# Patient Record
Sex: MALE
[De-identification: names, ages, dates, MRNs, and addresses within clinical notes are randomized per-mention and may not be internally consistent; named-entity substitution may affect disease eponyms.]

---

## 2020-09-15 NOTE — RAD
Portable frontal chest radiograph:

9/15/2020



COMPARISON: 3/31/2020



HISTORY: Wheezing, shortness of breath



FINDINGS: Mild increased linear interstitial density noted with pulmonary hyperinflation suggesting a
ir trapping. Question a history of COPD. There is atherosclerotic calcification of the aortic arch.

There is no pneumothorax or pleural fluid and no focal consolidation or alveolar edema.



IMPRESSION: Interstitial prominence and pulmonary hyperinflation with no focal consolidation or alveo
lar edema.



Reported By: Jatin Mcdowell 

Electronically Signed:  9/15/2020 6:44 PM

## 2020-09-16 NOTE — CON
DATE OF CONSULTATION:  



HISTORY OF PRESENT ILLNESS:  Yaniv Hearn is a 79-year-old gentleman, who has

known history of aspirin allergy.  He inadvertently took some aspirin yesterday for

pain in the left arm.  Two hours later on, he developed swelling in his arm, facial

edema, throat swelling up, and some shortness of breath.  Denied any wheezing or

coughing.  He says he has never had asthma and does not smoke.  He was given

Decadron 10, epinephrine 0.3, and transferred to [facility]. 



He is in the MICU this morning, he appears relatively stable.



PAST MEDICAL HISTORY:  Pertinent for hypertension and hypothyroidism.



PREVIOUS SURGERIES:  None recently.



HOME MEDICATIONS:  Include;

1. __________.

2. Reglan 10.

3. Losartan 25.

4. Synthroid 88.

5. Breo one puff a day.

6. Plavix 75.

7. Dutasteride 0.5 a day. 

He is now started on Solu-Medrol and Dulera.



REVIEW OF SYSTEMS:  Ten-point negative.



PHYSICAL EXAMINATION:

VITAL SIGNS:  Saturations are 100% on room air, temperature 98, blood pressure

124/75, pulse __________. 

CHEST:  No wheezing.  No crackles. 

CARDIAC:  Normal S1 and S2.  No gallops. 

ABDOMEN:  No masses.



DIAGNOSTIC STUDIES:  His x-ray was clear.  White count 9000.  Lytes are normal.  PO2

was 79, pCO2 of 35, pH 7.40, on room air. 



Renal function normal.



ASSESSMENT:  

1. 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

no wheezing myself. 

2. History of hypothyroidism.

3. History of hypertension. 



Pulmonary wise, restart home medication.  Switch him over to oral prednisone.  Can

probably be discharged home in the next 24 to 48 hours if he remains stable. 



Consultation note 70 minutes, 50% direct patient care.







Job ID:  [id number]

## 2020-09-16 NOTE — HP
REASON FOR ADMISSION:  Difficulty with breathing.



HISTORY OF PRESENT ILLNESS:  This is a 79-year-old male patient, who had left arm

pain and took some ibuprofen, subsequently developed shortness of breath.  Went to

the urgent care, found to be wheezing.  He was given a dose of Decadron and neb

treatment without any improvement.  His oxygenation was still low.  EMS was called

and he was transported to our emergency room.  En route, he did receive 0.3 mg IV of

epinephrine.  The patient did improve.  Currently, he is in the IMCU.  Appears to be

doing very well in no acute distress. 



The patient denies taking aspirin, although it was mentioned in the notes that he

took aspirin.  He had only took Advil.  He is allergic to aspirin, does develop what

sounded like anaphylaxis to it. 



PAST MEDICAL HISTORY:  

1. High blood pressure.

2. Hypothyroidism.

3. Asthma.



SOCIAL HISTORY:  Does not smoke.  Does not drink alcohol.



FAMILY HISTORY:  Reviewed found to be noncontributory.



REVIEW OF SYSTEMS:  All systems reviewed except the above mentioned, found to be

negative. 



PHYSICAL EXAMINATION:

GENERAL:  Awake, alert, and oriented, does not appear in distress. 

VITAL SIGNS:  His blood pressure is 130/71, heart rate of 78, and saturating 100%

room air. 

HEENT:  Head is nontraumatic and normocephalic.  Pupils equal and reactive.

Extraocular movements are intact.  Nonicteric sclerae.  Well injected conjunctivae.

Oral mucosa normal.  Nasal mucosa normal. 

NECK:  Supple.  No adenopathy.  No murmur.  Thyroid is not palpable.  Trachea is

midline.  No supraclavicular lymphadenopathy. 

HEART:  S1 and S2.  Regular.  No murmur.  No gallops.  No friction rubs.  No

displacement of PMI. 

LUNGS:  Clear to auscultation bilaterally.  No wheezes, rhonchi, or crackles.  Bowel

sounds are positive.  Nontender abdomen.  No visceromegaly. 

EXTREMITIES:  No lower extremity edema.  No cyanosis. 

NEURO:  Cranial nerves 2 through 12 within normal limits.  Normal motor function.

Normal sensory function.  Normal reflexes. 



LABORATORY DATA:  Blood work shows a WBC of 9.5, hemoglobin of 13.8, and platelets

of 167.  D-dimer 0.71.  ABG shows a pH of 7.4, pCO2 of 35.1, and bicarb of 21.

Sodium 138, potassium 3.6, bicarb 21, BUN 23, and creatinine 1.7.  Troponin less

than 0.01.  Chest x-ray shows interstitial prominence and pulmonary hyperinflation.

No focal consolidation or alveolar edema. 



ASSESSMENT AND PLAN:  This is a 79-year-old male patient, presenting after what

sounded like an anaphylactic reaction to ibuprofen.  He is already allergic to

aspirin.  Did receive Decadron and neb treatments, then had to receive epinephrine.

He did have a substantial improvement and feels better already, but we want to admit

him to monitor him for delayed anaphylaxis. 



The patient will be admitted to IM.  We will start him on IV Solu-Medrol to

prevent any delayed anaphylaxis. 



In regard of his hypothyroidism, we will continue levothyroxine. 



In regards of his asthma, continue with his inhalers. 



For his high blood pressure, we will have him on IV hydralazine on as-needed basis.

We will hold angiotensin receptor blockers for now just as a precautionary measure. 



For deep venous thrombosis prophylaxis, he will be on sequential compression devices.







Job ID:  [id number]

## 2020-09-16 NOTE — DIS
DATE OF ADMISSION:  09/15/2020



DATE OF DISCHARGE:  09/16/2020



DISCHARGE DIAGNOSES:  Anaphylaxis secondary to ibuprofen.

CONSULTATIONS:  Pulmonary, Dr. Jah Power.

PROCEDURES:  None.



BRIEF HISTORY OF PRESENT ILLNESS:  This is a 79-year-old male with no past 
medical

history except for hypertension who presented to the emergency room after he

experienced swelling in his arm, facial swelling, and shortness of breath.  The

patient states that he had some left arm pain.  He has an allergy to aspirin, so

took ibuprofen.  3 hours later he started developing difficulty breathing,

itching, and felt extremely sick.  He called the ambulance and was given a dose 
of

Decadron and nebulizer without any improvement.  His oxygenation was still low 
and

he came to the emergency room where he received 0.3 mg of IV epinephrine.  The

patient was admitted to the [facility] for further workup. 



HOSPITAL COURSE:

  Anaphylaxis:  The patient was started on IV steroids with

significant improvement.  His troponins were negative x3.  His chest x-ray 
showed

mild interstitial prominence with no edema.  The patient was ambulating well at 
the

time of discharge.  He states that he takes losartan chronically for 
hypertension

and he did not seem to have any angioedema, therefore, anaphylaxis is less 
likely

secondary to this.  The patient was discharged with prednisone for 4 days after

consultation with Pulmonary.  The patient was advised to discontinue ibuprofen. 
He

should follow up with his PCP in a week. 



He did meet inpatient criteria and improved faster than anticpated



DISCHARGE PHYSICAL EXAMINATION: 

 VITAL SIGNS:  Temperature 98.8, heart rate 71,

respiratory rate 19, O2 saturation 100% on room air, blood pressure 122/74. 

GENERAL:  The patient is alert, awake, and oriented x3. 

CVS:  Regular rate and rhythm with no murmur, rubs, or gallops. 

LUNGS:  Clear to auscultation bilaterally. 

ABDOMEN:  Positive bowel sounds, soft, nontender, nondistended. 

EXTREMITIES:  No edema.



PERTINENT LABORATORY DATA: 

 CBC 9/16:  White count 5.9, hemoglobin 13.6, hematocrit

39.7, platelet count 129. 

BMP 9/16:  Unremarkable except for chloride of 108, bicarb of 20. 

Troponin I:  Normal x3. 

LFTs 9/15:  Normal. 

ABG 9/15:  Shows pH of 7.4, pCO2 of 35, pO2 of 79.9.



IMAGING: 

 Chest x-ray 9/15:  Shows interstitial prominence and pulmonary

hyperinflation with no edema. 



DISCHARGE CONDITION:  Stable.

ACTIVITY:  As tolerated.

DIET:  Heart healthy diet.



DISCHARGE INSTRUCTIONS:  The patient should follow up with his PCP in a week.







Job ID:  [id number]



[id number]

## 2022-09-28 ENCOUNTER — HOSPITAL ENCOUNTER (OUTPATIENT)
Dept: HOSPITAL 92 - RAD | Age: 82
Discharge: HOME | End: 2022-09-28
Attending: INTERNAL MEDICINE
Payer: MEDICARE

## 2022-09-28 DIAGNOSIS — R06.00: Primary | ICD-10-CM

## 2022-09-28 PROCEDURE — 71046 X-RAY EXAM CHEST 2 VIEWS: CPT
